# Patient Record
Sex: MALE | Race: WHITE
[De-identification: names, ages, dates, MRNs, and addresses within clinical notes are randomized per-mention and may not be internally consistent; named-entity substitution may affect disease eponyms.]

---

## 2020-03-26 ENCOUNTER — HOSPITAL ENCOUNTER (INPATIENT)
Dept: HOSPITAL 96 - M.ERS | Age: 45
LOS: 3 days | Discharge: HOME | DRG: 432 | End: 2020-03-29
Attending: FAMILY MEDICINE | Admitting: FAMILY MEDICINE
Payer: MEDICAID

## 2020-03-26 VITALS — HEIGHT: 72.01 IN | WEIGHT: 233 LBS | BODY MASS INDEX: 31.56 KG/M2

## 2020-03-26 VITALS — DIASTOLIC BLOOD PRESSURE: 97 MMHG | SYSTOLIC BLOOD PRESSURE: 168 MMHG

## 2020-03-26 VITALS — SYSTOLIC BLOOD PRESSURE: 180 MMHG | DIASTOLIC BLOOD PRESSURE: 89 MMHG

## 2020-03-26 DIAGNOSIS — J69.0: ICD-10-CM

## 2020-03-26 DIAGNOSIS — F10.129: ICD-10-CM

## 2020-03-26 DIAGNOSIS — E43: ICD-10-CM

## 2020-03-26 DIAGNOSIS — K72.90: ICD-10-CM

## 2020-03-26 DIAGNOSIS — D73.1: ICD-10-CM

## 2020-03-26 DIAGNOSIS — K70.10: Primary | ICD-10-CM

## 2020-03-26 DIAGNOSIS — D61.818: ICD-10-CM

## 2020-03-26 DIAGNOSIS — K70.30: ICD-10-CM

## 2020-03-26 DIAGNOSIS — E87.1: ICD-10-CM

## 2020-03-26 DIAGNOSIS — Z82.49: ICD-10-CM

## 2020-03-26 DIAGNOSIS — I10: ICD-10-CM

## 2020-03-26 DIAGNOSIS — F41.1: ICD-10-CM

## 2020-03-26 DIAGNOSIS — F17.200: ICD-10-CM
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
## 2020-03-26 DIAGNOSIS — Z79.899: ICD-10-CM

## 2020-03-26 DIAGNOSIS — E11.65: ICD-10-CM

## 2020-03-26 LAB
ABSOLUTE BASOPHILS: 0 THOU/UL (ref 0–0.2)
ABSOLUTE EOSINOPHILS: 0 THOU/UL (ref 0–0.7)
ABSOLUTE MONOCYTES: 0.6 THOU/UL (ref 0–1.2)
ALBUMIN SERPL-MCNC: 2.4 G/DL (ref 3.4–5)
ALP SERPL-CCNC: 207 U/L (ref 46–116)
ALT SERPL-CCNC: 70 U/L (ref 30–65)
ANION GAP SERPL CALC-SCNC: 5 MMOL/L (ref 7–16)
APTT BLD: 32.8 SECONDS (ref 25–31.3)
AST SERPL-CCNC: 212 U/L (ref 15–37)
BACTERIA-REFLEX: (no result) /HPF
BASOPHILS NFR BLD AUTO: 0.7 %
BILIRUB SERPL-MCNC: 4.1 MG/DL
BILIRUB UR-MCNC: (no result) MG/DL
BUN SERPL-MCNC: 5 MG/DL (ref 7–18)
CALCIUM SERPL-MCNC: 7.5 MG/DL (ref 8.5–10.1)
CHLORIDE SERPL-SCNC: 90 MMOL/L (ref 98–107)
CO2 SERPL-SCNC: 32 MMOL/L (ref 21–32)
COLOR UR: YELLOW
CREAT SERPL-MCNC: 0.9 MG/DL (ref 0.6–1.3)
EOSINOPHIL NFR BLD: 1 %
GLUCOSE SERPL-MCNC: 326 MG/DL (ref 70–99)
GRANULOCYTES NFR BLD MANUAL: 59.8 %
HCT VFR BLD CALC: 36.6 % (ref 42–52)
HGB BLD-MCNC: 13.1 GM/DL (ref 14–18)
HYALINE CASTS #/AREA URNS LPF: (no result) /LPF
INR PPP: 1.8
KETONES UR STRIP-MCNC: (no result) MG/DL
LIPASE: 205 U/L (ref 73–393)
LYMPHOCYTES # BLD: 0.7 THOU/UL (ref 0.8–5.3)
LYMPHOCYTES NFR BLD AUTO: 20.2 %
MCH RBC QN AUTO: 34.9 PG (ref 26–34)
MCHC RBC AUTO-ENTMCNC: 35.8 G/DL (ref 28–37)
MCV RBC: 97.6 FL (ref 80–100)
MONOCYTES NFR BLD: 18.3 %
MPV: 8.1 FL. (ref 7.2–11.1)
NEUTROPHILS # BLD: 2 THOU/UL (ref 1.6–8.1)
NUCLEATED RBCS: 0 /100WBC
PLATELET # BLD EST: (no result) 10*3/UL
PLATELET COUNT*: 29 THOU/UL (ref 150–400)
POTASSIUM SERPL-SCNC: 4 MMOL/L (ref 3.5–5.1)
PROT SERPL-MCNC: 6.8 G/DL (ref 6.4–8.2)
PROT UR QL STRIP: (no result)
PROTHROMBIN TIME: 18 SECONDS (ref 9.2–11.5)
RBC # BLD AUTO: 3.76 MIL/UL (ref 4.5–6)
RBC # UR STRIP: (no result) /UL
RDW-CV: 18.4 % (ref 10.5–14.5)
SODIUM SERPL-SCNC: 127 MMOL/L (ref 136–145)
SP GR UR STRIP: 1.01 (ref 1–1.03)
SQUAMOUS: (no result) /LPF (ref 0–3)
URINE CLARITY: CLEAR
URINE GLUCOSE-RANDOM: (no result)
URINE LEUKOCYTES-REFLEX: NEGATIVE
URINE NITRITE-REFLEX: NEGATIVE
URINE WBC-REFLEX: (no result) /HPF (ref 0–5)
UROBILINOGEN UR STRIP-ACNC: 2 E.U./DL (ref 0.2–1)
WBC # BLD AUTO: 3.4 THOU/UL (ref 4–11)

## 2020-03-26 NOTE — CON
[address]  [address]                    CONSULTATION                  
_______________________________________________________________________________
 
Name:       PAULA HARRISON             Room:           [address] [place]  
M.R.#:  [id number]      Account #:      [id number]  
Admission:  03/26/20     Attend Phys:    Martha Villalba
Discharge:  03/29/20     Date of Birth:  [birth year]  
         Report #: [id number]
                                                                     [id number]  
_______________________________________________________________________________
THIS REPORT FOR:  //name//                      
 
cc:  [facility] physician unknown
     [facility] physician unknown                                       ~
THIS REPORT FOR:  //name//                      
 
CC: TONIO unknown
    Martha Segura MD
    [facility]
 
REFERRING PHYSICIAN:  Martha Segura MD
 
REASON FOR CONSULTATION:  Alcoholic liver disease.
 
IMPRESSION:
1.  Alcoholic hepatitis, superimposed on alcoholic cirrhosis with a discriminant
function score of 34 and a MELD of 18 with mild hepatic encephalopathy -- the
patient needs steroids to decrease the risk of mortality.
2.  Decompensated liver disease secondary to chronic alcohol abuse with
associated ascites and encephalopathy.
3.  Pancytopenia secondary to hypersplenism secondary to #1.
4.  Relapsing alcohol abuse with the patient stating that he had been sober for
over 160 days until a relapse with signs and symptoms of alcohol withdrawal at
this time.
 
RECOMMENDATIONS:
1.  We will begin the patient on prednisolone 40 mg once daily for the next 28
days and then decrease the dose by 10 mg every 4 days until he gets to 10 mg
then decrease by 5 mg every third today for 16-ioana taper.
2.  We will request records from [facility] including upper
and lower endoscopies performed in January of this year and discharge from
10/2019 as well.
3.  We will schedule the patient for CT scan of the abdomen and pelvis with oral
and IV contrast to evaluate his liver in the presence or absence of ascites.
4.  Alcohol withdrawal precautions.
5.  Alcohol rehab upon discharge.
6.  Dementia.  Follow up at [facility] for both primary
care and GI care.
 
HISTORY OF PRESENT ILLNESS:  The patient is a 44-year-old white male with
decompensated liver disease secondary to alcohol abuse, who was admitted to the
hospital because of alcohol withdrawal.  We are asked to see and to evaluate the
status of his liver disease and make recommendations regarding his care.  He
presented to the Emergency Room on the 26th with alcohol intoxication as well as
a fall and was intoxicated at the time of admission.  He has fallen off the
 
 
 
[place] [address]                    CONSULTATION                  
_______________________________________________________________________________
 
Name:       PAULA HARRISON              Room:           [address] [place]  
[facility].#:  [id number]      Account #:      [id number]  
Admission:  03/26/20     Attend Phys:    Martha Villalba
Discharge:  03/29/20     Date of Birth:  [birth year]  
         Report #: [id number]
                                                                     [id number]  
_______________________________________________________________________________
mary 3 weeks ago when he got drunk and he was drinking excessive amount of
alcohol, mostly beer.  He had bruises over his face and arms because of the fall
and swelling in his abdomen.  He has a history of decompensated liver disease
secondary to alcohol and was hospitalized back in 10/2019 by his report at
[facility] and underwent upper and lower endoscopies
earlier this year for unclear reasons.  He states that nothing was found on any
of these examinations.  We did not have any of those records regarding the same.
 He is admitted to the hospital here with alcohol intoxication, alcohol
withdrawal.
 
The patient is a good historian at this point in time and appears to be fairly
coherent.  Does have some slowness of his thought processes.  He denies any
complaints of any dysphagia, odynophagia, postprandial pain or any problem with
black stools, tarry stools or any blood in the stools.  He has not had any
abdominal pain.  He has been on diuretics in the past and has not been on it
recently because he quit drinking for over 160 days.  He has never had a liver
biopsy, but has had history of ascites and peripheral edema for which he was on
diuretics.  The patient states he underwent upper and lower endoscopy back in
January of this year at [facility] and those records are
being requested.  He is currently going through withdrawal and is anxious to get
out of the hospital and get back on the wagon.
 
ALLERGIES:  None.
 
MEDICATIONS:  Include amlodipine, clonidine, folic acid, thiamine, lactulose,
and Xanax.
 
PAST MEDICAL AND SURGICAL HISTORY:  Remarkable for underlying hypertension.  He
has decompensated liver disease secondary to alcohol abuse, history of alcohol
with family history of the same as well, has also a family history of substance
abuse.  He has no history of hepatitis A, B, or C.
 
SOCIAL HISTORY:  The patient has a girlfriend.  He does not smoke.  He drinks on
a daily basis and has done so for years.
 
FAMILY HISTORY:  Remarkable for alcohol abuse, substance abuse.
 
PHYSICAL EXAMINATION:
GENERAL:  Revealed an ill-appearing 44-year-old gentleman who is awake and
alert.
CARDIOPULMONARY:  Revealed a regular rate and rhythm.
LUNGS:  Clear.
ABDOMEN:  Soft and not tender.  He does have possibly some ascites, but is not
particularly impressive.  He does have peripheral edema.  He has no asterixis.
 
LABORATORY TESTS:  Revealed white count 3.4, hemoglobin 13.1, platelet count
 
 
 
[place] [address]                    CONSULTATION                  
_______________________________________________________________________________
 
Name:       PAULA HARRISON             Room:           [address] [place]  
[facility].#:  [id number]      Account #:      [id number]  
Admission:  03/26/20     Attend Phys:    Martha mendieta dalia Mojica
Discharge:  03/29/20     Date of Birth:  [birth year]  
         Report #: [id number]
                                                                     [id number]  
_______________________________________________________________________________
29,000, MCV is 97.6, and RDW is 18.4.  His sodium is 127, potassium 4.0,
chloride 90, bicarbonate is 32, BUN 5, creatinine 0.9.  Total bilirubin 4.1,
alkaline phosphatase 207, , ALT 70, albumin of only 2.4.  His protime on
admission was 18.0 with an INR of 1.8.
 
DISCUSSION:
1.  At the present time, the patient has a discriminant function score of 34
with some mild encephalopathy.  As such, he avoids the use of steroids.  We will
begin him on methylprednisolone or prednisolone 40 mg once daily and then he
will need to be tapered over after 28 days as I mentioned above.
2.  We will request records from [facility] in regards to
the same.  I have discussed the plans with the patient as well and the patient
agreeable to the same.
 
 
 
 
 
 
 
 
 
 
 
 
 
 
 
 
 
 
 
 
 
 
 
 
 
 
 
 
 
 
 
                       
                                        By:                                
                 
D: 03/29/20 1335_______________________________________
T: 03/29/20 1437Osmar Weeks DO             /nt

## 2020-03-27 VITALS — SYSTOLIC BLOOD PRESSURE: 148 MMHG | DIASTOLIC BLOOD PRESSURE: 64 MMHG

## 2020-03-27 VITALS — SYSTOLIC BLOOD PRESSURE: 135 MMHG | DIASTOLIC BLOOD PRESSURE: 68 MMHG

## 2020-03-27 VITALS — SYSTOLIC BLOOD PRESSURE: 193 MMHG | DIASTOLIC BLOOD PRESSURE: 93 MMHG

## 2020-03-27 VITALS — DIASTOLIC BLOOD PRESSURE: 85 MMHG | SYSTOLIC BLOOD PRESSURE: 170 MMHG

## 2020-03-27 VITALS — SYSTOLIC BLOOD PRESSURE: 152 MMHG | DIASTOLIC BLOOD PRESSURE: 87 MMHG

## 2020-03-27 VITALS — DIASTOLIC BLOOD PRESSURE: 93 MMHG | SYSTOLIC BLOOD PRESSURE: 179 MMHG

## 2020-03-27 LAB
ALBUMIN SERPL-MCNC: 2.2 G/DL (ref 3.4–5)
ALP SERPL-CCNC: 209 U/L (ref 46–116)
ALT SERPL-CCNC: 57 U/L (ref 30–65)
ANION GAP SERPL CALC-SCNC: 6 MMOL/L (ref 7–16)
AST SERPL-CCNC: 189 U/L (ref 15–37)
BILIRUB SERPL-MCNC: 3.4 MG/DL
BUN SERPL-MCNC: 4 MG/DL (ref 7–18)
CALCIUM SERPL-MCNC: 7.5 MG/DL (ref 8.5–10.1)
CHLORIDE SERPL-SCNC: 98 MMOL/L (ref 98–107)
CHOLEST SERPL-MCNC: 137 MG/DL (ref ?–200)
CO2 SERPL-SCNC: 31 MMOL/L (ref 21–32)
CREAT SERPL-MCNC: 0.8 MG/DL (ref 0.6–1.3)
EST. AVERAGE GLUCOSE BLD GHB EST-MCNC: 151 MG/DL
GLUCOSE SERPL-MCNC: 312 MG/DL (ref 70–99)
GLYCOHEMOGLOBIN (HGB A1C): 6.9 % (ref 4.8–5.6)
HCT VFR BLD CALC: 35.4 % (ref 42–52)
HCT VFR BLD CALC: 38.4 % (ref 42–52)
HDLC SERPL-MCNC: 15 MG/DL (ref 40–?)
HGB BLD-MCNC: 12.7 GM/DL (ref 14–18)
HGB BLD-MCNC: 13.7 GM/DL (ref 14–18)
LDLC SERPL-MCNC: 63 MG/DL (ref ?–100)
MAGNESIUM SERPL-MCNC: 1.8 MG/DL (ref 1.8–2.4)
MCH RBC QN AUTO: 35 PG (ref 26–34)
MCH RBC QN AUTO: 35.5 PG (ref 26–34)
MCHC RBC AUTO-ENTMCNC: 35.7 G/DL (ref 28–37)
MCHC RBC AUTO-ENTMCNC: 36 G/DL (ref 28–37)
MCV RBC: 98 FL (ref 80–100)
MCV RBC: 98.6 FL (ref 80–100)
MPV: 7.3 FL. (ref 7.2–11.1)
MPV: 8.5 FL. (ref 7.2–11.1)
PLATELET COUNT*: 24 THOU/UL (ref 150–400)
PLATELET COUNT*: 26 THOU/UL (ref 150–400)
POTASSIUM SERPL-SCNC: 4.1 MMOL/L (ref 3.5–5.1)
PROT SERPL-MCNC: 6.4 G/DL (ref 6.4–8.2)
RBC # BLD AUTO: 3.59 MIL/UL (ref 4.5–6)
RBC # BLD AUTO: 3.92 MIL/UL (ref 4.5–6)
RDW-CV: 18.3 % (ref 10.5–14.5)
RDW-CV: 18.6 % (ref 10.5–14.5)
SERUM ASSESSMENT: CLEAR
SODIUM SERPL-SCNC: 135 MMOL/L (ref 136–145)
TC:HDL: 9.1 RATIO
TRIGL SERPL-MCNC: 296 MG/DL (ref ?–150)
VLDLC SERPL CALC-MCNC: 59 MG/DL (ref ?–40)
WBC # BLD AUTO: 2.3 THOU/UL (ref 4–11)
WBC # BLD AUTO: 2.5 THOU/UL (ref 4–11)

## 2020-03-27 NOTE — EKG
[place] [address]
Phone:  [phone]                     ELECTROCARDIOGRAM REPORT      
_______________________________________________________________________________
 
Name:         PAULA HARRISON            Room:          [address]    ADM [place] 
[facility]#:    [id number]     Account #:     [id number]  
Admission:    20    Attend Phys:   Martha rudolph Sa
Discharge:                Date of Birth: [birth year]  
Date of Service: 20  Report #:      [id number]
        [id number]
_______________________________________________________________________________
THIS REPORT FOR:  //name//                      
 
                         [facility] ED
                                       
Test Date:    2020               Test Time:    19:12:45
Pat Name:     PAULA HARRISON           Department:   
Patient ID:   [id number]            Room:         [facility]
Gender:       M                        Technician:   FL
:          1975               Requested By: Yogesh Dominguez
Order Number: [id number] MD:   Claude Chaudhary
                                 Measurements
Intervals                              Axis          
Rate:         105                      P:            52
ME:           118                      QRS:          -40
QRSD:         97                       T:            51
QT:           340                                    
QTc:          450                                    
                           Interpretive Statements
Sinus tachycardia
Left axis deviation
RSR' in V1 or V2, probably normal variant
Baseline wander in lead(s) V3,V4,V5
No previous ECG available for comparison
 
Electronically Signed On 3- 9:52:33 CDT by Claude Chaudhary
[email or web address]
 
 
 
 
 
 
 
 
 
 
 
 
 
 
 
 
 
 
  <ELECTRONICALLY SIGNED>
                                           By: Claude Chaudhary MD, FACC      
  20     0952
D: 20   _____________________________________
T: 20   Claude Chaudhary MD, FACC        /EPI

## 2020-03-28 VITALS — DIASTOLIC BLOOD PRESSURE: 83 MMHG | SYSTOLIC BLOOD PRESSURE: 156 MMHG

## 2020-03-28 VITALS — DIASTOLIC BLOOD PRESSURE: 70 MMHG | SYSTOLIC BLOOD PRESSURE: 148 MMHG

## 2020-03-28 VITALS — SYSTOLIC BLOOD PRESSURE: 146 MMHG | DIASTOLIC BLOOD PRESSURE: 79 MMHG

## 2020-03-28 VITALS — DIASTOLIC BLOOD PRESSURE: 79 MMHG | SYSTOLIC BLOOD PRESSURE: 160 MMHG

## 2020-03-28 VITALS — DIASTOLIC BLOOD PRESSURE: 87 MMHG | SYSTOLIC BLOOD PRESSURE: 185 MMHG

## 2020-03-28 VITALS — DIASTOLIC BLOOD PRESSURE: 81 MMHG | SYSTOLIC BLOOD PRESSURE: 147 MMHG

## 2020-03-28 VITALS — DIASTOLIC BLOOD PRESSURE: 70 MMHG | SYSTOLIC BLOOD PRESSURE: 139 MMHG

## 2020-03-28 LAB
ALBUMIN SERPL-MCNC: 2.3 G/DL (ref 3.4–5)
ALP SERPL-CCNC: 190 U/L (ref 46–116)
ALT SERPL-CCNC: 71 U/L (ref 30–65)
ANION GAP SERPL CALC-SCNC: 5 MMOL/L (ref 7–16)
AST SERPL-CCNC: 199 U/L (ref 15–37)
BILIRUB SERPL-MCNC: 5.5 MG/DL
BUN SERPL-MCNC: 7 MG/DL (ref 7–18)
CALCIUM SERPL-MCNC: 7.9 MG/DL (ref 8.5–10.1)
CHLORIDE SERPL-SCNC: 97 MMOL/L (ref 98–107)
CO2 SERPL-SCNC: 29 MMOL/L (ref 21–32)
CREAT SERPL-MCNC: 0.7 MG/DL (ref 0.6–1.3)
GLUCOSE SERPL-MCNC: 250 MG/DL (ref 70–99)
HCT VFR BLD CALC: 37.2 % (ref 42–52)
HGB BLD-MCNC: 13.2 GM/DL (ref 14–18)
INR PPP: 1.9
MAGNESIUM SERPL-MCNC: 1.8 MG/DL (ref 1.8–2.4)
MCH RBC QN AUTO: 34.7 PG (ref 26–34)
MCHC RBC AUTO-ENTMCNC: 35.4 G/DL (ref 28–37)
MCV RBC: 97.9 FL (ref 80–100)
MPV: 7.8 FL. (ref 7.2–11.1)
PLATELET COUNT*: 19 THOU/UL (ref 150–400)
POTASSIUM SERPL-SCNC: 4.4 MMOL/L (ref 3.5–5.1)
PROT SERPL-MCNC: 6.8 G/DL (ref 6.4–8.2)
PROTHROMBIN TIME: 19.1 SECONDS (ref 9.2–11.5)
RBC # BLD AUTO: 3.79 MIL/UL (ref 4.5–6)
RDW-CV: 18.3 % (ref 10.5–14.5)
SODIUM SERPL-SCNC: 131 MMOL/L (ref 136–145)
WBC # BLD AUTO: 2 THOU/UL (ref 4–11)

## 2020-03-29 VITALS — SYSTOLIC BLOOD PRESSURE: 165 MMHG | DIASTOLIC BLOOD PRESSURE: 87 MMHG

## 2020-03-29 VITALS — SYSTOLIC BLOOD PRESSURE: 141 MMHG | DIASTOLIC BLOOD PRESSURE: 68 MMHG

## 2020-03-29 VITALS — SYSTOLIC BLOOD PRESSURE: 144 MMHG | DIASTOLIC BLOOD PRESSURE: 71 MMHG

## 2020-03-29 LAB
ALBUMIN SERPL-MCNC: 1.9 G/DL (ref 3.4–5)
ALP SERPL-CCNC: 153 U/L (ref 46–116)
ALT SERPL-CCNC: 56 U/L (ref 30–65)
ANION GAP SERPL CALC-SCNC: 3 MMOL/L (ref 7–16)
AST SERPL-CCNC: 133 U/L (ref 15–37)
BILIRUB SERPL-MCNC: 4.1 MG/DL
BUN SERPL-MCNC: 8 MG/DL (ref 7–18)
CALCIUM SERPL-MCNC: 7.4 MG/DL (ref 8.5–10.1)
CHLORIDE SERPL-SCNC: 101 MMOL/L (ref 98–107)
CO2 SERPL-SCNC: 31 MMOL/L (ref 21–32)
CREAT SERPL-MCNC: 0.7 MG/DL (ref 0.6–1.3)
GLUCOSE SERPL-MCNC: 271 MG/DL (ref 70–99)
HCT VFR BLD CALC: 34 % (ref 42–52)
HGB BLD-MCNC: 12.2 GM/DL (ref 14–18)
INR PPP: 1.9
MAGNESIUM SERPL-MCNC: 1.7 MG/DL (ref 1.8–2.4)
MCH RBC QN AUTO: 35.6 PG (ref 26–34)
MCHC RBC AUTO-ENTMCNC: 35.9 G/DL (ref 28–37)
MCV RBC: 99.3 FL (ref 80–100)
MPV: 8.2 FL. (ref 7.2–11.1)
PLATELET COUNT*: 18 THOU/UL (ref 150–400)
POTASSIUM SERPL-SCNC: 4.2 MMOL/L (ref 3.5–5.1)
PROT SERPL-MCNC: 5.7 G/DL (ref 6.4–8.2)
PROTHROMBIN TIME: 19.4 SECONDS (ref 9.2–11.5)
RBC # BLD AUTO: 3.43 MIL/UL (ref 4.5–6)
RDW-CV: 18.4 % (ref 10.5–14.5)
SODIUM SERPL-SCNC: 135 MMOL/L (ref 136–145)
WBC # BLD AUTO: 2.6 THOU/UL (ref 4–11)